# Patient Record
Sex: FEMALE | Race: WHITE | NOT HISPANIC OR LATINO | Employment: OTHER | URBAN - METROPOLITAN AREA
[De-identification: names, ages, dates, MRNs, and addresses within clinical notes are randomized per-mention and may not be internally consistent; named-entity substitution may affect disease eponyms.]

---

## 2020-03-02 ENCOUNTER — TRANSFERRED RECORDS (OUTPATIENT)
Dept: HEALTH INFORMATION MANAGEMENT | Facility: CLINIC | Age: 80
End: 2020-03-02

## 2020-11-12 ENCOUNTER — TRANSFERRED RECORDS (OUTPATIENT)
Dept: HEALTH INFORMATION MANAGEMENT | Facility: CLINIC | Age: 80
End: 2020-11-12

## 2021-02-01 ENCOUNTER — TRANSFERRED RECORDS (OUTPATIENT)
Dept: HEALTH INFORMATION MANAGEMENT | Facility: CLINIC | Age: 81
End: 2021-02-01

## 2024-02-27 ENCOUNTER — TRANSFERRED RECORDS (OUTPATIENT)
Dept: HEALTH INFORMATION MANAGEMENT | Facility: CLINIC | Age: 84
End: 2024-02-27

## 2024-06-25 ENCOUNTER — MEDICAL CORRESPONDENCE (OUTPATIENT)
Dept: HEALTH INFORMATION MANAGEMENT | Facility: CLINIC | Age: 84
End: 2024-06-25
Payer: MEDICARE

## 2024-06-26 ENCOUNTER — TELEPHONE (OUTPATIENT)
Dept: ANESTHESIOLOGY | Facility: CLINIC | Age: 84
End: 2024-06-26
Payer: MEDICARE

## 2024-06-26 NOTE — TELEPHONE ENCOUNTER
Received Records from [facility] [address] [place] [address]    External Pain Management Referral    Ph [phone]    Sent to scanning

## 2024-08-30 ENCOUNTER — VIRTUAL VISIT (OUTPATIENT)
Dept: FAMILY MEDICINE | Facility: CLINIC | Age: 84
End: 2024-08-30
Payer: MEDICARE

## 2024-08-30 ENCOUNTER — TELEPHONE (OUTPATIENT)
Dept: FAMILY MEDICINE | Facility: CLINIC | Age: 84
End: 2024-08-30

## 2024-08-30 DIAGNOSIS — E78.2 MIXED HYPERLIPIDEMIA: ICD-10-CM

## 2024-08-30 DIAGNOSIS — E03.9 ACQUIRED HYPOTHYROIDISM: ICD-10-CM

## 2024-08-30 DIAGNOSIS — F32.9 MAJOR DEPRESSIVE DISORDER WITH CURRENT ACTIVE EPISODE, UNSPECIFIED DEPRESSION EPISODE SEVERITY, UNSPECIFIED WHETHER RECURRENT: ICD-10-CM

## 2024-08-30 DIAGNOSIS — M54.50 CHRONIC BILATERAL LOW BACK PAIN WITHOUT SCIATICA: Primary | ICD-10-CM

## 2024-08-30 DIAGNOSIS — G89.29 CHRONIC BILATERAL LOW BACK PAIN WITHOUT SCIATICA: Primary | ICD-10-CM

## 2024-08-30 PROCEDURE — 99204 OFFICE O/P NEW MOD 45 MIN: CPT | Mod: 95 | Performed by: FAMILY MEDICINE

## 2024-08-30 RX ORDER — CITALOPRAM HYDROBROMIDE 10 MG/1
10 TABLET ORAL DAILY
COMMUNITY

## 2024-08-30 RX ORDER — PANTOPRAZOLE SODIUM 40 MG/1
40 TABLET, DELAYED RELEASE ORAL DAILY
COMMUNITY

## 2024-08-30 RX ORDER — LEVOTHYROXINE SODIUM 25 UG/1
75 TABLET ORAL DAILY
COMMUNITY

## 2024-08-30 RX ORDER — ATORVASTATIN CALCIUM 40 MG/1
40 TABLET, FILM COATED ORAL DAILY
COMMUNITY

## 2024-08-30 RX ORDER — FESOTERODINE FUMARATE 4 MG/1
4 TABLET, FILM COATED, EXTENDED RELEASE ORAL DAILY
COMMUNITY

## 2024-08-30 RX ORDER — MIRABEGRON 25 MG/1
25 TABLET, FILM COATED, EXTENDED RELEASE ORAL DAILY
COMMUNITY
End: 2024-09-30

## 2024-08-30 RX ORDER — NORTRIPTYLINE HCL 10 MG
10 CAPSULE ORAL AT BEDTIME
Qty: 90 CAPSULE | Refills: 3 | Status: SHIPPED | OUTPATIENT
Start: 2024-08-30

## 2024-08-30 ASSESSMENT — PATIENT HEALTH QUESTIONNAIRE - PHQ9
SUM OF ALL RESPONSES TO PHQ QUESTIONS 1-9: 11
SUM OF ALL RESPONSES TO PHQ QUESTIONS 1-9: 11
10. IF YOU CHECKED OFF ANY PROBLEMS, HOW DIFFICULT HAVE THESE PROBLEMS MADE IT FOR YOU TO DO YOUR WORK, TAKE CARE OF THINGS AT HOME, OR GET ALONG WITH OTHER PEOPLE: SOMEWHAT DIFFICULT

## 2024-08-30 NOTE — PROGRESS NOTES
Latoya is a 84 year old who is being evaluated via a billable video visit.    How would you like to obtain your AVS? Mail a copy  If the video visit is dropped, the invitation should be resent by: Text to cell phone: [phone]  Will anyone else be joining your video visit? No,  on the same screen      Assessment & Plan     (M54.50,  G89.29) Chronic bilateral low back pain without sciatica  (primary encounter diagnosis)  Comment: Discussed plan of care with pt. Ref to pain medicine and PT. Pt amenable to start of low dose TCA nortriptyline. Pt amenable to this. Responded well to injections prior and is feeling like she needs again. Reviewed physical therapy and how it would be helpful. Pt amenable to referral.   Plan: Physical Therapy  Referral, Pain         Management  Referral, DX Bone Density,        nortriptyline (PAMELOR) 10 MG capsule    (F32.9) Major depressive disorder with current active episode, unspecified depression episode severity, unspecified whether recurrent  Comment: no si/hi/avh. On celexa. Will continue. Pt reports kapadia snot need refills a this time. Certainly depression can worsen chronic pain consider increased dosing in the future.   Plan: TSH with free T4 reflex    (E03.9) Acquired hypothyroidism  Comment: recheck labs to check for thyroid levels. Currently taking 25 mcg of levothyroxine.   Plan: CBC with platelets, Comprehensive metabolic         panel (BMP + Alb, Alk Phos, ALT, AST, Total.         Bili, TP), Hemoglobin A1c, Lipid panel reflex         to direct LDL Fasting    (E78.2) Mixed hyperlipidemia  Comment: Maintain statin. Will need repeat labs to check on titration on atorvastatin.   Plan: Lipid panel reflex to direct LDL Fasting            Depression Screening Follow Up        8/30/2024    10:09 AM   PHQ   PHQ-9 Total Score 5   Q9: Thoughts of better off dead/self-harm past 2 weeks Not at all         8/30/2024    10:09 AM   Last PHQ-9   1.  Little  interest or pleasure in doing things 2   2.  Feeling down, depressed, or hopeless 1   3.  Trouble falling or staying asleep, or sleeping too much 0   4.  Feeling tired or having little energy 1   5.  Poor appetite or overeating 0   6.  Feeling bad about yourself 1   7.  Trouble concentrating 0   8.  Moving slowly or restless 0   Q9: Thoughts of better off dead/self-harm past 2 weeks 0   PHQ-9 Total Score 5   Difficulty at work, home, or with people Somewhat difficult         Follow Up Actions Taken  Patient counseled, no additional follow up at this time.         Lonnie Klein is a 84 year old, presenting for the following health issues:  Physical (Est care)      8/30/2024    10:04 AM   Additional Questions   Roomed by Tati     Healthy Habits:     Taking medications regularly:  1    Barriers to taking medications:  Remembering to take  History of Present Illness       Reason for visit:  Est care, refferal for back pain    She eats 2-3 servings of fruits and vegetables daily.She consumes 2 sweetened beverage(s) daily.She exercises with enough effort to increase her heart rate 9 or less minutes per day.  She exercises with enough effort to increase her heart rate 3 or less days per week. She is missing 1 dose(s) of medications per week.  She is not taking prescribed medications regularly due to remembering to take.     Pt is an 84-year-old female who presents with acute on chronic worsening of her back pain.  Reports that for many years she has had waxing and waning symptoms of low back pain.  She denies any sciatic pain reports no radiation of her pain.  She reports that many years ago she received an epidural injection in her spine which was very helpful for pain control.  She would like a referral for pain medicine so that she can be reevaluated for this again.  She is currently using only over-the-counter medications which she does have her pain.  She reports that this is inadequate for pain  control.    We discussed referral for physical therapy and patient is amenable to this as well as referral for pain medicine the patient is amenable to the symptoms interventions.  We also discussed initiation of nortriptyline and patient is amenable to that.  She has never taken prior        Objective    Vitals - Patient Reported  Pain Score: Extreme Pain (8)  Pain Loc: Low Back        Physical Exam   GENERAL: alert and no distress  EYES: Eyes grossly normal to inspection.  No discharge or erythema, or obvious scleral/conjunctival abnormalities.  RESP: No audible wheeze, cough, or visible cyanosis.    SKIN: Visible skin clear. No significant rash, abnormal pigmentation or lesions.  NEURO: Cranial nerves grossly intact.  Mentation and speech appropriate for age.  PSYCH: Appropriate affect, tone, and pace of words    Reviewed Chart that is marked for merge. 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       Video-Visit Details    Type of service:  Video Visit   Originating Location (pt. Location): Home    Distant Location (provider location):  On-site  Platform used for Video Visit: Holly  Signed Electronically by: Yajaira Cruz MD

## 2024-09-01 ENCOUNTER — LAB (OUTPATIENT)
Dept: LAB | Facility: CLINIC | Age: 84
End: 2024-09-01
Payer: MEDICARE

## 2024-09-01 ENCOUNTER — HOSPITAL ENCOUNTER (EMERGENCY)
Facility: CLINIC | Age: 84
End: 2024-09-01
Payer: COMMERCIAL

## 2024-09-01 DIAGNOSIS — E03.9 ACQUIRED HYPOTHYROIDISM: ICD-10-CM

## 2024-09-01 DIAGNOSIS — F32.9 MAJOR DEPRESSIVE DISORDER WITH CURRENT ACTIVE EPISODE, UNSPECIFIED DEPRESSION EPISODE SEVERITY, UNSPECIFIED WHETHER RECURRENT: ICD-10-CM

## 2024-09-01 DIAGNOSIS — E78.2 MIXED HYPERLIPIDEMIA: ICD-10-CM

## 2024-09-01 LAB
ALBUMIN SERPL BCG-MCNC: 3.8 G/DL (ref 3.5–5.2)
ALP SERPL-CCNC: 90 U/L (ref 40–150)
ALT SERPL W P-5'-P-CCNC: 18 U/L (ref 0–50)
ANION GAP SERPL CALCULATED.3IONS-SCNC: 12 MMOL/L (ref 7–15)
AST SERPL W P-5'-P-CCNC: 26 U/L (ref 0–45)
BILIRUB SERPL-MCNC: 0.5 MG/DL
BUN SERPL-MCNC: 17.5 MG/DL (ref 8–23)
CALCIUM SERPL-MCNC: 9 MG/DL (ref 8.8–10.4)
CHLORIDE SERPL-SCNC: 102 MMOL/L (ref 98–107)
CHOLEST SERPL-MCNC: 122 MG/DL
CREAT SERPL-MCNC: 0.93 MG/DL (ref 0.51–0.95)
EGFRCR SERPLBLD CKD-EPI 2021: 60 ML/MIN/1.73M2
ERYTHROCYTE [DISTWIDTH] IN BLOOD BY AUTOMATED COUNT: 13.2 % (ref 10–15)
FASTING STATUS PATIENT QL REPORTED: YES
FASTING STATUS PATIENT QL REPORTED: YES
GLUCOSE SERPL-MCNC: 101 MG/DL (ref 70–99)
HBA1C MFR BLD: 5.7 %
HCO3 SERPL-SCNC: 25 MMOL/L (ref 22–29)
HCT VFR BLD AUTO: 36.8 % (ref 35–47)
HDLC SERPL-MCNC: 38 MG/DL
HGB BLD-MCNC: 12.5 G/DL (ref 11.7–15.7)
LDLC SERPL CALC-MCNC: 53 MG/DL
MCH RBC QN AUTO: 31.6 PG (ref 26.5–33)
MCHC RBC AUTO-ENTMCNC: 34 G/DL (ref 31.5–36.5)
MCV RBC AUTO: 93 FL (ref 78–100)
NONHDLC SERPL-MCNC: 84 MG/DL
PLATELET # BLD AUTO: 183 10E3/UL (ref 150–450)
POTASSIUM SERPL-SCNC: 3.9 MMOL/L (ref 3.4–5.3)
PROT SERPL-MCNC: 6.8 G/DL (ref 6.4–8.3)
RBC # BLD AUTO: 3.95 10E6/UL (ref 3.8–5.2)
SODIUM SERPL-SCNC: 139 MMOL/L (ref 135–145)
TRIGL SERPL-MCNC: 157 MG/DL
TSH SERPL DL<=0.005 MIU/L-ACNC: 0.75 UIU/ML (ref 0.3–4.2)
WBC # BLD AUTO: 7.3 10E3/UL (ref 4–11)

## 2024-09-01 PROCEDURE — 84443 ASSAY THYROID STIM HORMONE: CPT

## 2024-09-01 PROCEDURE — 85027 COMPLETE CBC AUTOMATED: CPT

## 2024-09-01 PROCEDURE — 80053 COMPREHEN METABOLIC PANEL: CPT

## 2024-09-01 PROCEDURE — 80061 LIPID PANEL: CPT

## 2024-09-01 PROCEDURE — 36415 COLL VENOUS BLD VENIPUNCTURE: CPT

## 2024-09-01 PROCEDURE — 83036 HEMOGLOBIN GLYCOSYLATED A1C: CPT

## 2024-09-03 ENCOUNTER — TELEPHONE (OUTPATIENT)
Dept: FAMILY MEDICINE | Facility: CLINIC | Age: 84
End: 2024-09-03
Payer: MEDICARE

## 2024-09-03 DIAGNOSIS — G89.29 CHRONIC BILATERAL LOW BACK PAIN WITHOUT SCIATICA: Primary | ICD-10-CM

## 2024-09-03 DIAGNOSIS — M54.50 CHRONIC BILATERAL LOW BACK PAIN WITHOUT SCIATICA: Primary | ICD-10-CM

## 2024-09-03 NOTE — TELEPHONE ENCOUNTER
I have signed orders for spine clinic but pt has a referral already for [facility] medicine  ref. Can we check in on the status of that?

## 2024-09-03 NOTE — TELEPHONE ENCOUNTER
Order/Referral Request    Who is requesting: patient    Orders being requested: back injections    Reason service is needed/diagnosis: back pain    When are orders needed by: asap    Has this been discussed with Provider: Yes    Does patient have a preference on a Group/Provider/Facility?   emy    Does patient have an appointment scheduled?: No    Where to send orders: Place orders within Epic    Okay to leave a detailed message?: Yes at Cell number on file:    Telephone Information:   Mobile [phone]   Mobile Not on file.

## 2024-09-04 NOTE — TELEPHONE ENCOUNTER
Patient is going to call her health care center in [place] where she spends the winter, she is checking to see if she has already had this completed.     Patient leaves for [place] in October and spends 6 months there.    Georgina MORRELLO/

## 2024-09-30 ENCOUNTER — HOSPITAL ENCOUNTER (OUTPATIENT)
Dept: GENERAL RADIOLOGY | Facility: HOSPITAL | Age: 84
Discharge: HOME | End: 2024-09-30
Attending: PAIN MEDICINE | Admitting: PAIN MEDICINE
Payer: MEDICARE

## 2024-09-30 ENCOUNTER — OFFICE VISIT (OUTPATIENT)
Dept: PHYSICAL MEDICINE AND REHAB | Facility: CLINIC | Age: 84
End: 2024-09-30
Payer: COMMERCIAL

## 2024-09-30 VITALS — OXYGEN SATURATION: 97 % | HEART RATE: 68 BPM | DIASTOLIC BLOOD PRESSURE: 51 MMHG | SYSTOLIC BLOOD PRESSURE: 92 MMHG

## 2024-09-30 DIAGNOSIS — M47.816 SPONDYLOSIS OF LUMBAR REGION WITHOUT MYELOPATHY OR RADICULOPATHY: ICD-10-CM

## 2024-09-30 DIAGNOSIS — M25.551 HIP PAIN, RIGHT: Primary | ICD-10-CM

## 2024-09-30 DIAGNOSIS — M48.061 SPINAL STENOSIS OF LUMBAR REGION WITHOUT NEUROGENIC CLAUDICATION: ICD-10-CM

## 2024-09-30 DIAGNOSIS — G89.29 CHRONIC BILATERAL LOW BACK PAIN WITHOUT SCIATICA: ICD-10-CM

## 2024-09-30 DIAGNOSIS — M25.551 HIP PAIN, RIGHT: ICD-10-CM

## 2024-09-30 DIAGNOSIS — M54.50 CHRONIC BILATERAL LOW BACK PAIN WITHOUT SCIATICA: ICD-10-CM

## 2024-09-30 PROBLEM — R09.89 DECREASED CARDIAC OUTPUT: Status: ACTIVE | Noted: 2022-07-06

## 2024-09-30 PROCEDURE — 73502 X-RAY EXAM HIP UNI 2-3 VIEWS: CPT

## 2024-09-30 PROCEDURE — 99204 OFFICE O/P NEW MOD 45 MIN: CPT | Performed by: PAIN MEDICINE

## 2024-09-30 ASSESSMENT — PAIN SCALES - GENERAL: PAINLEVEL: NO PAIN (0)

## 2024-09-30 NOTE — PATIENT INSTRUCTIONS
I ordered an x-ray of your right hip.  Once I reviewed it I will have one of our nurses give you a call with results and recommendations.    ~Please call Nurse Navigation line [phone] with any questions or concerns about your treatment plan, if symptoms worsen and you would like to be seen urgently, or if you have problems controlling bladder and bowel function.

## 2024-09-30 NOTE — PROGRESS NOTES
ASSESSMENT: Adriana Malone is a 84 year old female who presents for consultation at the request of [facility] PCP No Ref-Primary, Physician, with a past medical history significant for decreased cardiac output who presents today for new patient evaluation of low back pain and right groin pain:    -Overall patient's physical exam is reassuring that she has normal strength and reflexes in her lower extremities.  Right groin pain is likely secondary to right hip osteoarthritis.  Provocative maneuvers of the right hip are positive.  Low back pain likely secondary to lumbar spondylosis versus spinal stenosis.    Patient is neurologically intact on exam. No myelopathic or red flag symptoms.     Diagnoses and all orders for this visit:  Hip pain, right  -     XR Hip Right 2-3 Views; Future  Chronic bilateral low back pain without sciatica  -     Spine  Referral  Spondylosis of lumbar region without myelopathy or radiculopathy  Spinal stenosis of lumbar region without neurogenic claudication    PLAN:  Reviewed spine anatomy and disease process. Discussed diagnosis and treatment options with the patient today. A shared decision making model was used.  The patient's values and choices were respected. The following represents what was discussed and decided upon by the provider and the patient.      -DIAGNOSTIC TESTS:  Images were personally reviewed and interpreted and explained to patient today using spine model.   -- MRI of lumbar spine dated 3/20/2020 report is reviewed and does show facet arthropathy in the lumbar spine as well as L4-5 disc protrusion with mild central canal and bilateral foraminal stenosis.  There is also mild to moderate bilateral foraminal stenosis at L5-S1.  -- I ordered an x-ray of the right hip.  Once have reviewed this I will have one of our nurses give her a call with results and recommendations.  At that time we will likely recommend a right ultrasound-guided hip joint  injection.  They are leaving back for [place] on October 23, 2024.    -PHYSICAL THERAPY: Patient had physical therapy in the past which has aggravated and worsened her pain.      -MEDICATIONS: No changes to medications.  -  Discussed multiple medication options today with patient. Discussed risks, side effects, and proper use of medications. Patient verbalized understanding.    -INTERVENTIONS: Will likely order a right ultrasound-guided hip joint injection after x-ray.  I recommend the patient reach out to the pain center in [place] regarding injection that was going to be done in June prior to them coming here to see if she can get that set up for when she is back.  She leaves here on October 23, 2024.  Discussed risks and benefits of injections with patient today.    -PATIENT EDUCATION: We discussed pain management in a multimodal fashion including physical therapy, medication management, possible future injections.    -FOLLOW-UP:   Patient will follow-up as needed.    Advised patient to call the Spine Center if symptoms worsen or you have problems controlling bladder and bowel function.   ______________________________________________________________________    SUBJECTIVE:  HPI:  Adriana Malone  Is a 84 year old female who presents today for new patient evaluation of low back pain.  Patient seen by her primary care provider on 8/30/2024 with chronic low back pain.  Patient was referred to physical therapy as well as spine center.  In the past she has done well with injections.  Patient notes that she has had low back pain for many years.  In fact she has had injections in her spine in [place].  Prior to coming here in June she had 1 that was scheduled at the pain center she goes to a [place], however she was having a urinary tract infection and they are not able to give the injection to her.  Her pain is across to her low back as well as in her right groin area.  Back pain is  worse with standing and walking for more than 10 to 15 minutes.  If she sits pain is improved to 0/10.  At its worst is 8/10.  Pain in her right groin area is worse when getting up or sitting down.  Getting in and out of a car is also difficult.  She denies any bowel or bladder changes, fevers, chills, unintentional weight loss.    -Treatment to Date: MRI of lumbar spine dated 3/20/2020.  Physical therapy in the past.  Spine injection in the past.    -Medications:    Current Outpatient Medications   Medication Sig Dispense Refill    atorvastatin (LIPITOR) 40 MG tablet Take 40 mg by mouth daily.      citalopram (CELEXA) 10 MG tablet Take 10 mg by mouth daily.      fesoterodine fumarate (TOVIAZ) 4 MG TB24 24 hr tablet Take 4 mg by mouth daily.      levothyroxine (LEVOXYL) 75 MCG tablet Take by mouth daily      levothyroxine (SYNTHROID/LEVOTHROID) 25 MCG tablet Take 75 mcg by mouth daily.      nortriptyline (PAMELOR) 10 MG capsule Take 1 capsule (10 mg) by mouth at bedtime. 90 capsule 3    pantoprazole (PROTONIX) 40 MG EC tablet Take 40 mg by mouth daily.      PRAVASTATIN SODIUM PO        No current facility-administered medications for this visit.       Allergies   Allergen Reactions    Allegra [Fexofenadine] Hives    Allegra [Fexofenadine]     Feldene [Piroxicam] Hives    Feldene [Piroxicam]        No past medical history on file.     Patient Active Problem List   Diagnosis    Decreased cardiac output       Surgical history: None    Family history: History of cancer, heart disease.    Reviewed past medical, surgical, and family history with patient found on new patient intake packet located in EMR Media tab.     SOCIAL HX: She denies smoking, drinking alcohol or using recreational drugs.    ROS:  Specifically negative for bowel/bladder dysfunction, balance changes, headache, dizziness, foot drop, fevers, chills, appetite changes, nausea/vomiting, unexplained weight loss. Otherwise 13 systems reviewed are negative.  Please see the patient's intake questionnaire from today for details.    OBJECTIVE:  BP 92/51   Pulse 68   SpO2 97%     PHYSICAL EXAMINATION:    --CONSTITUTIONAL:  Vital signs as above.  No acute distress.  The patient is well nourished and well groomed.  --PSYCHIATRIC:  Appropriate mood and affect. The patient is awake, alert, oriented to person, place, time and answering questions appropriately with clear speech.    --SKIN:  Skin over the face, bilateral lower extremities, and posterior torso is clean, dry, intact without rashes.    --RESPIRATORY: Normal rhythm and effort. No abnormal accessory muscle breathing patterns noted.   --STANDING EXAMINATION:  Normal lumbar lordosis noted, no lateral shift.  --MUSCULOSKELETAL: Lumbar spine inspection reveals no evidence of deformity. Range of motion is mildly limited in lumbar flexion, extension, lateral rotation.  Tenderness to palpation of the lumbar paraspinal muscles. Straight leg raising in the supine position is negative to radicular pain.   --SACROILIAC JOINT: Negative distraction.  Positive for right groin pain Jordy's with reproduction of pain to affected extremity. One Finger point test negative.  --GROSS MOTOR: Gait is non-antalgic. Easily arises from a seated position. Toe walking and heel walking are normal without significant difficulty.    --LOWER EXTREMITY MOTOR TESTING:  Plantar flexion left 5/5, right 5/5   Dorsiflexion left 5/5, right 5/5   Great toe MTP extension left 5/5, right 5/5  Knee flexion left 5/5, right 5/5  Knee extension left 5/5, right 5/5   Hip flexion left 5/5, right 5/5  Hip abduction left 5/5, right 5/5  Hip adduction left 5/5, right 5/5   --HIPS: Decreased right internal rotation of hip with pain.  Jordy's is positive for the right groin.  Stinchfield test is positive on the right.  --NEUROLOGICAL:  2/4 patellar and achilles reflexes bilaterally.  Sensation to light touch is intact in the bilateral L4, L5, and S1 dermatomes.  Babinski is negative. No clonus.   --VASCULAR:  2/4 dorsalis pedis pulses bilaterally.  Bilateral lower extremities are warm.  There is no pitting edema of the bilateral lower extremities.    RESULTS: Prior medical records from [facility] primary care provider were reviewed today.    Imaging: Lumbar spine Imaging was personally reviewed.

## 2024-09-30 NOTE — LETTER
9/30/2024      Adriana Malone  [address]  [facility] [address]      Dear Colleague,    Thank you for referring your patient, Adriana Malone, to the [facility]. Please see a copy of my visit note below.    ASSESSMENT: Adriana Malone is a 84 year old female who presents for consultation at the request of [facility] PCP No Ref-Primary, Physician, with a past medical history significant for decreased cardiac output who presents today for new patient evaluation of low back pain and right groin pain:    -Overall patient's physical exam is reassuring that she has normal strength and reflexes in her lower extremities.  Right groin pain is likely secondary to right hip osteoarthritis.  Provocative maneuvers of the right hip are positive.  Low back pain likely secondary to lumbar spondylosis versus spinal stenosis.    Patient is neurologically intact on exam. No myelopathic or red flag symptoms.     Diagnoses and all orders for this visit:  Hip pain, right  -     XR Hip Right 2-3 Views; Future  Chronic bilateral low back pain without sciatica  -     Spine  Referral  Spondylosis of lumbar region without myelopathy or radiculopathy  Spinal stenosis of lumbar region without neurogenic claudication    PLAN:  Reviewed spine anatomy and disease process. Discussed diagnosis and treatment options with the patient today. A shared decision making model was used.  The patient's values and choices were respected. The following represents what was discussed and decided upon by the provider and the patient.      -DIAGNOSTIC TESTS:  Images were personally reviewed and interpreted and explained to patient today using spine model.   -- MRI of lumbar spine dated 3/20/2020 report is reviewed and does show facet arthropathy in the lumbar spine as well as L4-5 disc protrusion with mild central canal and bilateral foraminal stenosis.  There is also mild to moderate  bilateral foraminal stenosis at L5-S1.  -- I ordered an x-ray of the right hip.  Once have reviewed this I will have one of our nurses give her a call with results and recommendations.  At that time we will likely recommend a right ultrasound-guided hip joint injection.  They are leaving back for [place] on October 23, 2024.    -PHYSICAL THERAPY: Patient had physical therapy in the past which has aggravated and worsened her pain.      -MEDICATIONS: No changes to medications.  -  Discussed multiple medication options today with patient. Discussed risks, side effects, and proper use of medications. Patient verbalized understanding.    -INTERVENTIONS: Will likely order a right ultrasound-guided hip joint injection after x-ray.  I recommend the patient reach out to the pain center in [place] regarding injection that was going to be done in June prior to them coming here to see if she can get that set up for when she is back.  She leaves here on October 23, 2024.  Discussed risks and benefits of injections with patient today.    -PATIENT EDUCATION: We discussed pain management in a multimodal fashion including physical therapy, medication management, possible future injections.    -FOLLOW-UP:   Patient will follow-up as needed.    Advised patient to call the Spine Center if symptoms worsen or you have problems controlling bladder and bowel function.   ______________________________________________________________________    SUBJECTIVE:  HPI:  Adriana Malone  Is a 84 year old female who presents today for new patient evaluation of low back pain.  Patient seen by her primary care provider on 8/30/2024 with chronic low back pain.  Patient was referred to physical therapy as well as spine center.  In the past she has done well with injections.  Patient notes that she has had low back pain for many years.  In fact she has had injections in her spine in [place].  Prior to coming here in June she had  1 that was scheduled at the pain center she goes to a [place], however she was having a urinary tract infection and they are not able to give the injection to her.  Her pain is across to her low back as well as in her right groin area.  Back pain is worse with standing and walking for more than 10 to 15 minutes.  If she sits pain is improved to 0/10.  At its worst is 8/10.  Pain in her right groin area is worse when getting up or sitting down.  Getting in and out of a car is also difficult.  She denies any bowel or bladder changes, fevers, chills, unintentional weight loss.    -Treatment to Date: MRI of lumbar spine dated 3/20/2020.  Physical therapy in the past.  Spine injection in the past.    -Medications:    Current Outpatient Medications   Medication Sig Dispense Refill     atorvastatin (LIPITOR) 40 MG tablet Take 40 mg by mouth daily.       citalopram (CELEXA) 10 MG tablet Take 10 mg by mouth daily.       fesoterodine fumarate (TOVIAZ) 4 MG TB24 24 hr tablet Take 4 mg by mouth daily.       levothyroxine (LEVOXYL) 75 MCG tablet Take by mouth daily       levothyroxine (SYNTHROID/LEVOTHROID) 25 MCG tablet Take 75 mcg by mouth daily.       nortriptyline (PAMELOR) 10 MG capsule Take 1 capsule (10 mg) by mouth at bedtime. 90 capsule 3     pantoprazole (PROTONIX) 40 MG EC tablet Take 40 mg by mouth daily.       PRAVASTATIN SODIUM PO        No current facility-administered medications for this visit.       Allergies   Allergen Reactions     Allegra [Fexofenadine] Hives     Allegra [Fexofenadine]      Feldene [Piroxicam] Hives     Feldene [Piroxicam]        No past medical history on file.     Patient Active Problem List   Diagnosis     Decreased cardiac output       Surgical history: None    Family history: History of cancer, heart disease.    Reviewed past medical, surgical, and family history with patient found on new patient intake packet located in EMR Media tab.     SOCIAL HX: She denies smoking, drinking  alcohol or using recreational drugs.    ROS:  Specifically negative for bowel/bladder dysfunction, balance changes, headache, dizziness, foot drop, fevers, chills, appetite changes, nausea/vomiting, unexplained weight loss. Otherwise 13 systems reviewed are negative. Please see the patient's intake questionnaire from today for details.    OBJECTIVE:  BP 92/51   Pulse 68   SpO2 97%     PHYSICAL EXAMINATION:    --CONSTITUTIONAL:  Vital signs as above.  No acute distress.  The patient is well nourished and well groomed.  --PSYCHIATRIC:  Appropriate mood and affect. The patient is awake, alert, oriented to person, place, time and answering questions appropriately with clear speech.    --SKIN:  Skin over the face, bilateral lower extremities, and posterior torso is clean, dry, intact without rashes.    --RESPIRATORY: Normal rhythm and effort. No abnormal accessory muscle breathing patterns noted.   --STANDING EXAMINATION:  Normal lumbar lordosis noted, no lateral shift.  --MUSCULOSKELETAL: Lumbar spine inspection reveals no evidence of deformity. Range of motion is mildly limited in lumbar flexion, extension, lateral rotation.  Tenderness to palpation of the lumbar paraspinal muscles. Straight leg raising in the supine position is negative to radicular pain.   --SACROILIAC JOINT: Negative distraction.  Positive for right groin pain Jordy's with reproduction of pain to affected extremity. One Finger point test negative.  --GROSS MOTOR: Gait is non-antalgic. Easily arises from a seated position. Toe walking and heel walking are normal without significant difficulty.    --LOWER EXTREMITY MOTOR TESTING:  Plantar flexion left 5/5, right 5/5   Dorsiflexion left 5/5, right 5/5   Great toe MTP extension left 5/5, right 5/5  Knee flexion left 5/5, right 5/5  Knee extension left 5/5, right 5/5   Hip flexion left 5/5, right 5/5  Hip abduction left 5/5, right 5/5  Hip adduction left 5/5, right 5/5   --HIPS: Decreased right  internal rotation of hip with pain.  Jordy's is positive for the right groin.  Stinchfield test is positive on the right.  --NEUROLOGICAL:  2/4 patellar and achilles reflexes bilaterally.  Sensation to light touch is intact in the bilateral L4, L5, and S1 dermatomes. Babinski is negative. No clonus.   --VASCULAR:  2/4 dorsalis pedis pulses bilaterally.  Bilateral lower extremities are warm.  There is no pitting edema of the bilateral lower extremities.    RESULTS: Prior medical records from [facility] primary care provider were reviewed today.    Imaging: Lumbar spine Imaging was personally reviewed.      Again, thank you for allowing me to participate in the care of your patient.        Sincerely,        Inderjit Parnell, DO

## 2024-10-01 ENCOUNTER — TELEPHONE (OUTPATIENT)
Dept: PHYSICAL MEDICINE AND REHAB | Facility: CLINIC | Age: 84
End: 2024-10-01
Payer: MEDICARE

## 2024-10-01 DIAGNOSIS — M25.551 HIP PAIN, RIGHT: Primary | ICD-10-CM

## 2024-10-01 NOTE — TELEPHONE ENCOUNTER
----- Message from Inderjit Parnell sent at 10/1/2024  1:21 PM CDT -----  Please call the patient let her know I have reviewed x-ray of her right hip.  It does show osteoarthritis in the right hip.  I recommend a right hip joint injection.  The patient can be scheduled on 10/11/2024 if that works with the patient scheduled.

## 2024-10-01 NOTE — TELEPHONE ENCOUNTER
Call placed to patient with provider's results and recommendations.  Pt stated understanding and would like to proceed. Order placed. 10/11 did not work for patient. Scheduled her for 10/7. Messaged PA team to see if OK from insurance standpoint.

## 2024-10-07 ENCOUNTER — RADIOLOGY INJECTION OFFICE VISIT (OUTPATIENT)
Dept: PHYSICAL MEDICINE AND REHAB | Facility: CLINIC | Age: 84
End: 2024-10-07
Attending: PAIN MEDICINE
Payer: MEDICARE

## 2024-10-07 VITALS
SYSTOLIC BLOOD PRESSURE: 94 MMHG | DIASTOLIC BLOOD PRESSURE: 52 MMHG | TEMPERATURE: 97.3 F | HEART RATE: 66 BPM | OXYGEN SATURATION: 96 %

## 2024-10-07 DIAGNOSIS — M25.551 HIP PAIN, RIGHT: ICD-10-CM

## 2024-10-07 PROCEDURE — 20611 DRAIN/INJ JOINT/BURSA W/US: CPT | Mod: RT | Performed by: PAIN MEDICINE

## 2024-10-07 RX ORDER — TRIAMCINOLONE ACETONIDE 40 MG/ML
INJECTION, SUSPENSION INTRA-ARTICULAR; INTRAMUSCULAR
Status: COMPLETED | OUTPATIENT
Start: 2024-10-07 | End: 2024-10-07

## 2024-10-07 RX ORDER — ROPIVACAINE HYDROCHLORIDE 5 MG/ML
INJECTION, SOLUTION EPIDURAL; INFILTRATION; PERINEURAL
Status: COMPLETED | OUTPATIENT
Start: 2024-10-07 | End: 2024-10-07

## 2024-10-07 RX ORDER — LIDOCAINE HYDROCHLORIDE 10 MG/ML
INJECTION, SOLUTION EPIDURAL; INFILTRATION; INTRACAUDAL; PERINEURAL
Status: COMPLETED | OUTPATIENT
Start: 2024-10-07 | End: 2024-10-07

## 2024-10-07 RX ADMIN — LIDOCAINE HYDROCHLORIDE 1 ML: 10 INJECTION, SOLUTION EPIDURAL; INFILTRATION; INTRACAUDAL; PERINEURAL at 13:46

## 2024-10-07 RX ADMIN — ROPIVACAINE HYDROCHLORIDE 4 ML: 5 INJECTION, SOLUTION EPIDURAL; INFILTRATION; PERINEURAL at 13:46

## 2024-10-07 RX ADMIN — TRIAMCINOLONE ACETONIDE 40 MG: 40 INJECTION, SUSPENSION INTRA-ARTICULAR; INTRAMUSCULAR at 13:46

## 2024-10-07 ASSESSMENT — PAIN SCALES - GENERAL: PAINLEVEL: NO PAIN (0)

## 2024-10-07 NOTE — PATIENT INSTRUCTIONS
DISCHARGE INSTRUCTIONS  During office hours (8:00 a.m.- 4:00 p.m.) questions or concerns may be answered  by calling [facility] at  [phone].  Messages received after hours will be returned the following business day.      In the case of an emergency, please dial 911 or seek assistance at the nearest Emergency Room/Urgent Care facility.     You may experience an increase in your symptoms for the first 2 days (It may take anywhere between 2 days- 2 weeks for the steroid to have maximum effect).    You may use ice on the injection site, as frequently as 20 minutes each hour if needed.    You may take your pain medicine.    You may shower. No swimming, tub bath or hot tub for 48 hours.  You may remove your bandaid/bandage as soon as you are home.    You may resume light activities, as tolerated.    Resume your usual diet as tolerated.    POSSIBLE STEROID SIDE EFFECTS (If steroid/cortisone was used for your procedure)  -If you experience these symptoms, it should only last for a short period  Swelling of the legs              Skin redness (flushing)     Mouth (oral) irritation   Blood sugar (glucose) levels            Sweats                    Mood changes  Headache  Sleeplessness  Weakened immune system for up to 14 days, which could increase the risk of sonu the COVID-19 virus and/or experiencing more severe symptoms of the disease, if exposed.  Decreased effectiveness of the flu vaccine if given within 2 weeks of the steroid.         POSSIBLE PROCEDURE SIDE EFFECTS  -Call the [facility] if you are concerned  Increased Pain           Increased numbness/tingling      Nausea/Vomiting          Bruising/bleeding at site      Redness or swelling                                              Difficulty walking      Weakness           Fever greater than 100.5

## 2024-10-12 ASSESSMENT — PATIENT HEALTH QUESTIONNAIRE - PHQ9: SUM OF ALL RESPONSES TO PHQ QUESTIONS 1-9: 5

## 2025-02-03 ENCOUNTER — TELEPHONE (OUTPATIENT)
Dept: FAMILY MEDICINE | Facility: CLINIC | Age: 85
End: 2025-02-03
Payer: MEDICARE

## 2025-02-03 NOTE — TELEPHONE ENCOUNTER
Patient Quality Outreach    Patient is due for the following:   Physical Annual Wellness Visit    Action(s) Taken:   Schedule a Annual Wellness Visit    Type of outreach:    Sent letter.    Questions for provider review:    None           Adelita Patino

## 2025-02-03 NOTE — LETTER
[address] [address]  [phone]        Adriana Malone  [address] [facility] [address]      February 3, 2025      Dear Adriana,    I care about your health and have reviewed your health plan, including your medical conditions, medication list, and lab results and am making recommendations based on this review, to better manage your health.    You are in particular need of attention regarding:  -Wellness (Physical) Visit     I am recommending that you:  -schedule a WELLNESS (Physical) APPOINTMENT with me.   I will check fasting labs the same day - nothing to eat except water and meds for 8-10 hours prior.      If you've had the preventative screening completed at another facility or feel you're not due for this screening, please call our clinic at the number listed above or send us a My Chart message so we can update our records. We would like to thank you in advance for taking the time to take care of your health.  If you have any questions, please don t hesitate to contact our clinic.    Sincerely,       Your [facility] Team

## 2025-07-22 ENCOUNTER — TELEPHONE (OUTPATIENT)
Dept: FAMILY MEDICINE | Facility: CLINIC | Age: 85
End: 2025-07-22
Payer: MEDICARE